# Patient Record
Sex: FEMALE | Race: WHITE | NOT HISPANIC OR LATINO | Employment: UNEMPLOYED | URBAN - METROPOLITAN AREA
[De-identification: names, ages, dates, MRNs, and addresses within clinical notes are randomized per-mention and may not be internally consistent; named-entity substitution may affect disease eponyms.]

---

## 2023-01-18 ENCOUNTER — TRANSCRIBE ORDERS (OUTPATIENT)
Dept: OBSTETRICS AND GYNECOLOGY | Facility: HOSPITAL | Age: 28
End: 2023-01-18
Payer: COMMERCIAL

## 2023-01-18 DIAGNOSIS — Z36.3 ANTENATAL SCREENING FOR MALFORMATION USING ULTRASONICS: Primary | ICD-10-CM

## 2023-01-18 DIAGNOSIS — Z34.90 PREGNANCY, UNSPECIFIED GESTATIONAL AGE: ICD-10-CM

## 2023-01-18 DIAGNOSIS — O09.299 PREVIOUS CHILD WITH CARDIAC ABNORMALITY, ANTEPARTUM: ICD-10-CM

## 2023-02-09 ENCOUNTER — OFFICE VISIT (OUTPATIENT)
Dept: OBSTETRICS AND GYNECOLOGY | Facility: HOSPITAL | Age: 28
End: 2023-02-09
Payer: COMMERCIAL

## 2023-02-09 ENCOUNTER — HOSPITAL ENCOUNTER (OUTPATIENT)
Dept: WOMENS IMAGING | Facility: HOSPITAL | Age: 28
Discharge: HOME | End: 2023-02-09
Admitting: NURSE PRACTITIONER
Payer: COMMERCIAL

## 2023-02-09 VITALS
DIASTOLIC BLOOD PRESSURE: 68 MMHG | HEIGHT: 58 IN | BODY MASS INDEX: 23.22 KG/M2 | WEIGHT: 110.6 LBS | SYSTOLIC BLOOD PRESSURE: 125 MMHG

## 2023-02-09 DIAGNOSIS — O09.299 PREVIOUS CHILD WITH CARDIAC ABNORMALITY, ANTEPARTUM: ICD-10-CM

## 2023-02-09 DIAGNOSIS — Z34.90 PREGNANCY, UNSPECIFIED GESTATIONAL AGE: ICD-10-CM

## 2023-02-09 DIAGNOSIS — Z82.79 FAMILY HISTORY OF CONGENITAL HEART DEFECT: Primary | ICD-10-CM

## 2023-02-09 DIAGNOSIS — Z36.3 ANTENATAL SCREENING FOR MALFORMATION USING ULTRASONICS: ICD-10-CM

## 2023-02-09 PROCEDURE — 76811 OB US DETAILED SNGL FETUS: CPT

## 2023-02-09 PROCEDURE — 76811 OB US DETAILED SNGL FETUS: CPT | Performed by: OBSTETRICS & GYNECOLOGY

## 2023-02-09 RX ORDER — PROMETHAZINE HYDROCHLORIDE 25 MG/1
1 TABLET ORAL 3 TIMES DAILY
COMMUNITY
Start: 2022-12-20

## 2023-02-09 RX ORDER — PRENATAL VIT/IRON FUM/FOLIC AC 27MG-0.8MG
TABLET ORAL DAILY
COMMUNITY

## 2023-02-09 NOTE — PROGRESS NOTES
Patient seen in [facility] clinic today. Please see full note in under imaging tab of patient chart in Epic (Viewpoint report).    Rosana Barney MD

## 2023-03-23 ENCOUNTER — HOSPITAL ENCOUNTER (OUTPATIENT)
Dept: WOMENS IMAGING | Facility: HOSPITAL | Age: 28
Discharge: HOME | End: 2023-03-23
Admitting: OBSTETRICS & GYNECOLOGY
Payer: COMMERCIAL

## 2023-03-23 ENCOUNTER — OFFICE VISIT (OUTPATIENT)
Dept: OBSTETRICS AND GYNECOLOGY | Facility: HOSPITAL | Age: 28
End: 2023-03-23
Payer: COMMERCIAL

## 2023-03-23 VITALS — WEIGHT: 117.4 LBS | BODY MASS INDEX: 24.97 KG/M2 | DIASTOLIC BLOOD PRESSURE: 75 MMHG | SYSTOLIC BLOOD PRESSURE: 119 MMHG

## 2023-03-23 DIAGNOSIS — Z34.90 PREGNANCY, UNSPECIFIED GESTATIONAL AGE: ICD-10-CM

## 2023-03-23 DIAGNOSIS — Z82.79 FAMILY HISTORY OF CONGENITAL HEART DEFECT: ICD-10-CM

## 2023-03-23 DIAGNOSIS — Z82.79 FAMILY HISTORY OF CONGENITAL HEART DEFECT: Primary | ICD-10-CM

## 2023-03-23 PROCEDURE — 76816 OB US FOLLOW-UP PER FETUS: CPT

## 2023-03-23 PROCEDURE — 93325 DOPPLER ECHO COLOR FLOW MAPG: CPT

## 2023-03-23 PROCEDURE — 76825 ECHO EXAM OF FETAL HEART: CPT

## 2023-03-23 NOTE — PROGRESS NOTES
Documentation of the ultrasound findings, images, and interpretations will be available in the patient's Viewpoint report located in the Chart Review Imaging tab in [id number].